# Patient Record
Sex: FEMALE | URBAN - METROPOLITAN AREA
[De-identification: names, ages, dates, MRNs, and addresses within clinical notes are randomized per-mention and may not be internally consistent; named-entity substitution may affect disease eponyms.]

---

## 2017-06-21 ENCOUNTER — APPOINTMENT (RX ONLY)
Dept: URBAN - METROPOLITAN AREA CLINIC 50 | Facility: CLINIC | Age: 42
Setting detail: DERMATOLOGY
End: 2017-06-21

## 2017-06-21 DIAGNOSIS — L63.8 OTHER ALOPECIA AREATA: ICD-10-CM

## 2017-06-21 PROBLEM — I10 ESSENTIAL (PRIMARY) HYPERTENSION: Status: ACTIVE | Noted: 2017-06-21

## 2017-06-21 PROCEDURE — 11900 INJECT SKIN LESIONS </W 7: CPT

## 2017-06-21 PROCEDURE — ? COUNSELING

## 2017-06-21 PROCEDURE — 99212 OFFICE O/P EST SF 10 MIN: CPT | Mod: 25

## 2017-06-21 PROCEDURE — ? INTRALESIONAL KENALOG

## 2017-06-21 ASSESSMENT — LOCATION SIMPLE DESCRIPTION DERM
LOCATION SIMPLE: FRONTAL SCALP
LOCATION SIMPLE: POSTERIOR SCALP

## 2017-06-21 ASSESSMENT — LOCATION DETAILED DESCRIPTION DERM
LOCATION DETAILED: LEFT INFERIOR OCCIPITAL SCALP
LOCATION DETAILED: MEDIAL FRONTAL SCALP

## 2017-06-21 ASSESSMENT — LOCATION ZONE DERM: LOCATION ZONE: SCALP

## 2017-06-21 NOTE — PROCEDURE: INTRALESIONAL KENALOG
Expiration Date (Optional): 08/2018
Kenalog Preparation: Kenalog
Total Volume Injected (Ccs- Only Use Numbers And Decimals): 6
Lot # (Optional): [id number]
Include Z78.9 (Other Specified Conditions Influencing Health Status) As An Associated Diagnosis?: No
Concentration Of Solution Injected (Mg/Ml): 3.3
Consent: The risks of atrophy were reviewed with the patient.
Detail Level: Detailed
Administered By (Optional): YUVAL
Medical Necessity Clause: This procedure was medically necessary because the lesions that were treated were:
X Size Of Lesion In Cm (Optional): 0

## 2024-04-23 ENCOUNTER — APPOINTMENT (RX ONLY)
Dept: URBAN - METROPOLITAN AREA CLINIC 125 | Facility: CLINIC | Age: 49
Setting detail: DERMATOLOGY
End: 2024-04-23

## 2024-04-23 DIAGNOSIS — L72.0 EPIDERMAL CYST: ICD-10-CM | Status: INADEQUATELY CONTROLLED

## 2024-04-23 PROCEDURE — ? INCISION AND DRAINAGE

## 2024-04-23 PROCEDURE — ? COUNSELING

## 2024-04-23 PROCEDURE — 10060 I&D ABSCESS SIMPLE/SINGLE: CPT

## 2024-04-23 ASSESSMENT — LOCATION ZONE DERM: LOCATION ZONE: FACE

## 2024-04-23 ASSESSMENT — LOCATION SIMPLE DESCRIPTION DERM: LOCATION SIMPLE: LEFT CHEEK

## 2024-04-23 ASSESSMENT — LOCATION DETAILED DESCRIPTION DERM
LOCATION DETAILED: LEFT SUPERIOR CENTRAL MALAR CHEEK
LOCATION DETAILED: LEFT SUPERIOR CENTRAL BUCCAL CHEEK

## 2024-04-23 NOTE — PROCEDURE: INCISION AND DRAINAGE
Detail Level: Detailed
Lesion Type: Cyst
Method: 11 blade
Curette: No
Anesthesia Type: 1% lidocaine with epinephrine and a 1:10 solution of 8.4% sodium bicarbonate
Anesthesia Volume In Cc: 1
Size Of Lesion In Cm (Optional But May Be Required For Some Insurances): 0
Drainage Amount?: moderate
Drainage Type?: cyst-like
Wound Care: Aquaphor
Dressing: dry sterile dressing
Epidermal Sutures: 4-0 Ethilon
Epidermal Closure: simple interrupted
Suture Text: The incision was partially closed with
Preparation Text: The area was prepped in the usual clean fashion.
Curette Text (Optional): After the contents were expressed a curette was used to partially remove the cyst wall.
Consent was obtained and risks were reviewed including but not limited to delayed wound healing, infection, need for multiple I and D's, and pain.
Post-Care Instructions: I reviewed with the patient in detail post-care instructions. Patient should keep wound covered and call the office should any redness, pain, swelling or worsening occur.

## 2024-04-23 NOTE — HPI: CYST
How Severe Is Your Cyst?: mild
Is This A New Presentation, Or A Follow-Up?: Cysts
Additional History: Patient presents with 2 cyst on her left cheek. She states they seem to be enlarging and are painful. She would like to have them removed today or discuss process

## 2024-12-11 ENCOUNTER — APPOINTMENT (OUTPATIENT)
Dept: URBAN - METROPOLITAN AREA CLINIC 125 | Facility: CLINIC | Age: 49
Setting detail: DERMATOLOGY
End: 2024-12-11

## 2024-12-11 DIAGNOSIS — M70.2 OLECRANON BURSITIS: ICD-10-CM

## 2024-12-11 PROBLEM — M70.22 OLECRANON BURSITIS, LEFT ELBOW: Status: ACTIVE | Noted: 2024-12-11

## 2024-12-11 PROCEDURE — ? COUNSELING

## 2024-12-11 PROCEDURE — ? ADDITIONAL NOTES

## 2024-12-11 PROCEDURE — 99212 OFFICE O/P EST SF 10 MIN: CPT

## 2024-12-11 ASSESSMENT — LOCATION ZONE DERM: LOCATION ZONE: ARM

## 2024-12-11 ASSESSMENT — LOCATION SIMPLE DESCRIPTION DERM: LOCATION SIMPLE: LEFT ELBOW

## 2024-12-11 ASSESSMENT — LOCATION DETAILED DESCRIPTION DERM: LOCATION DETAILED: LEFT ELBOW

## 2024-12-11 NOTE — PROCEDURE: ADDITIONAL NOTES
Detail Level: Simple
Render Risk Assessment In Note?: no
Additional Notes: - Discussed Ultrasound to confirm if cyst vs bursitis. If not a cyst will refer pt to general surgeon.